# Patient Record
(demographics unavailable — no encounter records)

---

## 2025-04-09 NOTE — HISTORY OF PRESENT ILLNESS
[de-identified] : 24M w/o PMH/PSH, works at a vet clinic, presents for evaluation of R inguinal bulge. Pt states he's been working out at the gym lifting weights for few years now, and past 2 years or so he noticed a R groin bulge intermittently appearing. He has mild discomfort over the area when it appears, has no issues w/ PO intake or having regular GI function. Pt was seen by his PCP Dr. Erasmo Miller, and was referred for surgical evaluation.   AVSS Gen: NAD Resp: nonlabored GI: soft, NT, ND, no guarding, R inguinal hernia reducible, nontender. L groin w/o obvious hernia defect  24M w/ reducible R inguinal hernia - discussed my findings w/ the patient. I discussed risks, benefits, and alternatives to robot-assisted laparoscopic repair of Righ, possible Left, inguinal hernia w/ mesh. Answered all relevant questions, patient would like to proceed w/ surgical planning - OR date 4/24/25 - Will need medical optimization from Dr. Miller. Pt was seen by Dr. Miller 3 days ago w/ basic labs drawn. Will follow up w/ his office regarding lab results and medical clearance.

## 2025-05-01 NOTE — REASON FOR VISIT
[Post Op: _________] : a [unfilled] post op visit [[email or web address]] : s/p robot-assisted laparoscopic repair of Right inguinal hernia (4/24/25)

## 2025-05-22 NOTE — HISTORY OF PRESENT ILLNESS
[de-identified] : Pt presents for postop visit after robot-assisted lap repair of R inguinal hernia w/ mesh. Pt has been well, eating and having GI function w/o issue. Incisional pain is mostly gone.   [de-identified] : Pt presenting for postop visit for the 2nd time. He states he has no more incisional pain. He did notice a small nodular like structure in the R inguinal region, nontender.  AVSS Gen: NAD Resp: nonlabored GI: soft, NT, ND, no guarding, incisions c/d/i, small nodular area in R groin region, most likely postop seroma  25M s/p robot laparoscopic repair of R inguinal hernia w/ mesh, recovering appropriately - discussed that seroma can be closely monitored, and it will be reabsorbed by the body graudally - re-eval of the seroma in 3 months before pt flys out to [place] for [employer]  25M s/p robot lap repair of R inguinal hernia w/ mesh - discussed routine postop care. Steri strip removal next wek - follow up in 2 weeks